# Patient Record
Sex: FEMALE | URBAN - METROPOLITAN AREA
[De-identification: names, ages, dates, MRNs, and addresses within clinical notes are randomized per-mention and may not be internally consistent; named-entity substitution may affect disease eponyms.]

---

## 2017-04-17 ENCOUNTER — TRANSFERRED RECORDS (OUTPATIENT)
Dept: HEALTH INFORMATION MANAGEMENT | Facility: CLINIC | Age: 52
End: 2017-04-17

## 2020-08-02 ENCOUNTER — VIRTUAL VISIT (OUTPATIENT)
Dept: FAMILY MEDICINE | Facility: OTHER | Age: 55
End: 2020-08-02

## 2020-08-03 NOTE — PROGRESS NOTES
"Date: 2020 09:59:48  Clinician: Prabha Samson  Clinician NPI: [id number]  Patient: Elena Lizarraga  Patient : 1965  Patient Address: [address] [place] [address] [place] [address]  Patient Phone: [phone]  Visit Protocol: URI  Patient Summary:  Elena is a 54 year old ( : 1965 ) female who initiated a Visit for COVID-19 (Coronavirus) evaluation and screening. When asked the question \"Please sign me up to receive news, health information and promotions. \", Elena responded \"Yes\".    When asked when her symptoms started, Elena reported that she does not have any symptoms.   She denies having recent facial or sinus surgery in the past 60 days and taking antibiotic medication in the past month.    Pertinent COVID-19 (Coronavirus) information  In the past 14 days, Elena has not worked in a congregate living setting.   She does not work or volunteer as healthcare worker or a  and does not work or volunteer in a healthcare facility.   Elena also has not lived in a congregate living setting in the past 14 days. She does not live with a healthcare worker.   Elena has not had a close contact with a laboratory-confirmed COVID-19 patient within the last 14 days.   Pertinent medical history  Elena does not get yeast infections when she takes antibiotics.   Elena does not need a return to work/school note.   Weight: 275 lbs   Elena does not smoke or use smokeless tobacco.   Weight: 275 lbs    MEDICATIONS: lisinopril-hydrochlorothiazide oral, Advair Diskus inhalation, ALLERGIES: NKDA  Clinician Response:  Dear Elena,  I am sorry you are not feeling well. To determine the most appropriate care for you, I would like you to be seen in person to further discuss your health history and symptoms.  You will not be charged for this Visit. Thank you for trusting us with your care.  COVID-19 (Coronavirus) General Information  Because there is currently no vaccine to " prevent infection, the best way to protect yourself is to avoid being exposed to this virus. Common symptoms of COVID-19 include but are not limited to fever, cough, and shortness of breath. These symptoms appear 2-14 days after you are exposed to the virus that causes COVID-19. Click here for more information from the CDC on how to protect yourself.  If you are sick with COVID-19 or suspect you are infected with the virus that causes COVID-19, follow the steps here from the CDC to help prevent the disease from spreading to people in your home and community.  Click here for general information from the CDC on testing.  If you develop any of these emergency warning signs for COVID-19, get medical attention immediately:     Trouble breathing    Persistent pain or pressure in the chest    New confusion or inability to arouse    Bluish lips or face      Call your doctor or clinic before going in. Call 911 if you have a medical emergency and notify the  you have or think you may have COVID-19.  For more detailed and up to date information on COVID-19 (Coronavirus), please visit the CDC website.   Diagnosis: Refer for additional evaluation  Diagnosis ICD: R69